# Patient Record
Sex: FEMALE | Race: WHITE | NOT HISPANIC OR LATINO | Employment: OTHER | URBAN - METROPOLITAN AREA
[De-identification: names, ages, dates, MRNs, and addresses within clinical notes are randomized per-mention and may not be internally consistent; named-entity substitution may affect disease eponyms.]

---

## 2017-03-20 ENCOUNTER — TRANSFERRED RECORDS (OUTPATIENT)
Dept: HEALTH INFORMATION MANAGEMENT | Facility: CLINIC | Age: 72
End: 2017-03-20

## 2017-03-20 ENCOUNTER — HOSPITAL ENCOUNTER (OUTPATIENT)
Dept: MAMMOGRAPHY | Facility: CLINIC | Age: 72
Discharge: HOME | End: 2017-03-20
Attending: FAMILY MEDICINE | Admitting: FAMILY MEDICINE
Payer: MEDICARE

## 2017-03-20 DIAGNOSIS — Z12.31 VISIT FOR SCREENING MAMMOGRAM: ICD-10-CM

## 2017-03-20 PROCEDURE — G0202 SCR MAMMO BI INCL CAD: HCPCS

## 2018-10-06 ENCOUNTER — HEALTH MAINTENANCE LETTER (OUTPATIENT)
Age: 73
End: 2018-10-06

## 2018-10-25 ENCOUNTER — TRANSFERRED RECORDS (OUTPATIENT)
Dept: HEALTH INFORMATION MANAGEMENT | Facility: CLINIC | Age: 73
End: 2018-10-25

## 2018-12-17 ENCOUNTER — HOSPITAL ENCOUNTER (OUTPATIENT)
Dept: MAMMOGRAPHY | Facility: CLINIC | Age: 73
Discharge: HOME | End: 2018-12-17
Attending: FAMILY MEDICINE | Admitting: FAMILY MEDICINE
Payer: MEDICARE

## 2018-12-17 ENCOUNTER — OFFICE VISIT (OUTPATIENT)
Dept: ENDOCRINOLOGY | Facility: CLINIC | Age: 73
End: 2018-12-17
Payer: MEDICARE

## 2018-12-17 ENCOUNTER — HOSPITAL ENCOUNTER (OUTPATIENT)
Dept: BONE DENSITY | Facility: CLINIC | Age: 73
End: 2018-12-17
Attending: INTERNAL MEDICINE
Payer: MEDICARE

## 2018-12-17 VITALS
HEIGHT: 62 IN | DIASTOLIC BLOOD PRESSURE: 62 MMHG | SYSTOLIC BLOOD PRESSURE: 119 MMHG | BODY MASS INDEX: 33.6 KG/M2 | HEART RATE: 75 BPM | WEIGHT: 182.6 LBS

## 2018-12-17 DIAGNOSIS — Z12.31 VISIT FOR SCREENING MAMMOGRAM: ICD-10-CM

## 2018-12-17 DIAGNOSIS — M81.0 AGE-RELATED OSTEOPOROSIS WITHOUT CURRENT PATHOLOGICAL FRACTURE: Primary | ICD-10-CM

## 2018-12-17 DIAGNOSIS — M81.0 AGE-RELATED OSTEOPOROSIS WITHOUT CURRENT PATHOLOGICAL FRACTURE: ICD-10-CM

## 2018-12-17 LAB — PTH-INTACT SERPL-MCNC: 43 PG/ML (ref 18–80)

## 2018-12-17 PROCEDURE — 82306 VITAMIN D 25 HYDROXY: CPT | Performed by: INTERNAL MEDICINE

## 2018-12-17 PROCEDURE — 77067 SCR MAMMO BI INCL CAD: CPT

## 2018-12-17 PROCEDURE — 36415 COLL VENOUS BLD VENIPUNCTURE: CPT | Performed by: INTERNAL MEDICINE

## 2018-12-17 PROCEDURE — 83735 ASSAY OF MAGNESIUM: CPT | Performed by: INTERNAL MEDICINE

## 2018-12-17 PROCEDURE — 77085 DXA BONE DENSITY AXL VRT FX: CPT

## 2018-12-17 PROCEDURE — 99215 OFFICE O/P EST HI 40 MIN: CPT | Performed by: INTERNAL MEDICINE

## 2018-12-17 PROCEDURE — 84100 ASSAY OF PHOSPHORUS: CPT | Performed by: INTERNAL MEDICINE

## 2018-12-17 PROCEDURE — 83970 ASSAY OF PARATHORMONE: CPT | Performed by: INTERNAL MEDICINE

## 2018-12-17 PROCEDURE — 80048 BASIC METABOLIC PNL TOTAL CA: CPT | Performed by: INTERNAL MEDICINE

## 2018-12-17 RX ORDER — VITAMIN K2 40 MCG
TABLET ORAL
COMMUNITY
Start: 2017-06-07

## 2018-12-17 RX ORDER — NICOTINE POLACRILEX 2 MG
1000 GUM BUCCAL
COMMUNITY
Start: 2017-06-07

## 2018-12-17 RX ORDER — FLUOCINONIDE 0.5 MG/G
CREAM TOPICAL
COMMUNITY
Start: 2018-10-31

## 2018-12-17 RX ORDER — ESCITALOPRAM OXALATE 20 MG/1
20 TABLET ORAL
COMMUNITY
Start: 2018-08-28

## 2018-12-17 ASSESSMENT — MIFFLIN-ST. JEOR: SCORE: 1286.52

## 2018-12-17 NOTE — PROGRESS NOTES
Name: Tamar Swift is a 73 year old woman, self referred for evaluation of   Previously seen by me at my former clinic ([facility]), here to establish care with [facility].    Chief Complaint   Patient presents with     Osteoporosis       HPI:  Recent issues:  Here for osteoporosis evaluation  Medical history from patient, outside records and Epic chart record        Previous history of scoliosis, osteoporosis, menopause  Abdominal hysterectomy age 29   Onset of hot flashes symptoms in her late 40's yoa  Issues with chronic back pain, has had orthopedic and neurology evaluations  Previous DEXA scans include 05, 07, 11/11/10  6/3/13 DEXA:   L1-2 T -1.2 and L1-4 T -0.7   Left femur neck T -2.0 and right femur neck T -1.1  11/30/15 DEXA:   L1-L4 T-score: T -1.4    Left hip fem neck T -1.6 and right hip fem neck T -1.4    Previous [facility] labs include:  Recent Labs   Lab Test 13  1225   TYRELL 9.5     Health history includes:  Bone fractures: none  Vitamin D deficiency: none   Kidney stones: none  Fam Hx:   Osteoporosis:   Kidney stones:   Hyperparathyroidism:  Recent [facility] labs include:  Lab Results   Component Value Date     2013    POTASSIUM 4.0 2014    CHLORIDE 103 2013    CO2 29 2013    ANIONGAP 10 2013     (A) 2014    BUN 20 2013    CR 0.78 2014    GFRESTIMATED 78 2014    GFRESTBLACK 90 2014    TYRELL 9.5 2013    TSH 1.13 2014     Osteoporosis medication use:  Took Evista with daily dosing for few weeks- months  Changed to Boniva monthly, took for approx 2 yrs duration   Had nausea, then discontinued ~ or   Prolia medication dosin2014, 2015, 2016... ~2017   Told of dental procedure 2017, to avoid Prolia     Supplements:   Vit D 2000 U capsule 1-every day   Calapatite Bone Builder 3-tabs daily (contains calcium 625 mg, phos 360 mg)  Recent  symptoms:   Low back pain and scoliosis, no indigestion   No dental pain      , lives in [place], also has son living with them  Sees Dr. Sarath Bliss for general medicine evaluations.    PMH/PSH:  Past Medical History:   Diagnosis Date     Allergic rhinitis, cause unspecified     cats, molds, dust mites     Benign neoplasm of breast 1963    incisional biopsy left breast     Chronic Pain Syndrome     eval by [facility]     Degeneration of intervertebral disc, site unspecified     lumbar,L4-5 andL5-S1deg disc with foraminal stenoses, mild left scoliosis     Depressive disorder, not elsewhere classified 1980s    chronic     Depressive disorder, not elsewhere classified      Dermatophytosis of nail     great toenails     Deviated nasal septum     repaired     Edema     episodic use diuretic     Esophageal reflux     eval by GI 10/05, resolved with dietary changes     Female stress incontinence     Marek Linda procedure     former smoker 2005    quit      Irritable bowel syndrome      LACTOSE INTOLERANCE      MEDICAL HISTORY OF -     echo - mild LVH with impaired LV relaxation, mild TR, EF 54%     Migraine, unspecified, without mention of intractable migraine without mention of status migrainosus     rare     NEURODERMATITIS      Normal Pregnancy      - vaginal, macrosomia     Obesity, unspecified      OSTEOPENIA 2002     Other and unspecified hyperlipidemia     elevated triglycerides     Other internal derangement of knee(717.89)     right knee arthroscopy - medial meniscus tear     Other kyphoscoliosis and scoliosis     mild, no surgery     Scoliosis (and kyphoscoliosis), idiopathic 2003     Uterovaginal Prolapse 1975    vaginal hysterectomy and AP repair     Past Surgical History:   Procedure Laterality Date     C ANTER VESICOURETHROPEXY,SIMPLE      Marek Linda incontinence procedure     [facility] BIOPSY BREAST, PERC NEEDLE CORE, NO IMAGING      left breast,  sclerosing adenosis     [facility] BIOPSY OF BREAST, OPEN INCISIONAL  1963    left breast mass     HC COLONOSCOPY THRU STOMA, DIAGNOSTIC  02-    normal     HC COLONOSCOPY THRU STOMA, DIAGNOSTIC      repeat in 7 yrs     HC KNEE SCOPE, DIAGNOSTIC      right knee - medial meniscus tear     HC REPAIR OF NASAL SEPTUM       HC UGI ENDOSCOPY, SIMPLE EXAM  2005    minimal erosion at EG junction, basically normal     HYSTERECTOMY, VAGINAL  1975    with AP repair, ovaries remain       Family Hx:  Family History   Problem Relation Age of Onset     Depression Father         post traumatic stress disorder, suicide age 59     Psychotic Disorder Son         bipolar     Heart Disease Mother          CHF age 92     Cancer Maternal Grandmother          esophageal cancer age 87     C.A.D. Paternal Grandfather          MI age 82         Social Hx:  Social History     Socioeconomic History     Marital status:      Spouse name: Corky     Number of children: 2     Years of education: 14     Highest education level: Not on file   Social Needs     Financial resource strain: Not on file     Food insecurity - worry: Not on file     Food insecurity - inability: Not on file     Transportation needs - medical: Not on file     Transportation needs - non-medical: Not on file   Occupational History     Occupation: housing supervisor     Comment: [employer]     Occupation: consultant     Comment: [employer]   Tobacco Use     Smoking status: Former Smoker     Packs/day: 0.50     Years: 23.00     Pack years: 11.50     Types: Cigarettes     Last attempt to quit: 1986     Years since quittin.9     Smokeless tobacco: Never Used   Substance and Sexual Activity     Alcohol use: Yes     Comment: 1 per month     Drug use: No     Sexual activity: Yes     Partners: Male     Comment: same partner since    Other Topics Concern      Service No     Blood Transfusions No     Caffeine Concern No      "Comment: little caffeine     Occupational Exposure No     Comment: works [employer], no patient care     Hobby Hazards No     Sleep Concern Yes     Stress Concern Yes     Comment: related to son and work     Weight Concern Yes     Comment: trying to lose     Special Diet No     Comment: lactose intolerant, some cheese     Back Care Yes     Comment: sees chiropracter for back pain     Exercise No     Bike Helmet Not Asked     Comment: no bike     Seat Belt Yes     Self-Exams Yes   Social History Narrative    Lives with .  Children age 33 and 30.  Son bipolar, continuing to have problems.                      MEDICATIONS:  has a current medication list which includes the following prescription(s): biotin, vitamin d3, docosahexaenoic acid, escitalopram, fluocinonide, hydrocodone-acetaminophen, vitamin k2, omega 3 500, alprazolam, calcium-vitamin d, triamcinolone, and triamterene-hctz.    ROS:     ROS: 10 point ROS neg other than the symptoms noted above in the HPI.    GENERAL: fatigue, wt stable- difficulty losing wt; denies fevers, chills, night sweats.    HEENT: no dysphagia, odonophagia, diplopia, neck pain  THYROID:  no apparent hyper or hypothyroid symptoms  CV: no chest pain, pressure, palpitations  LUNGS: no SOB, ALVAREZ, cough, wheezing   ABDOMEN: no diarrhea, constipation, abdominal pain  EXTREMITIES: no rashes, ulcers, edema  NEUROLOGY: no headaches, denies changes in vision, tingling, extremitiy numbness   MSK:  Back and leg pains; some leg weakness  SKIN: no rashes or lesions  : no menses  PSYCH:  stable mood, no significant anxiety or depression  ENDOCRINE: no heat or cold intolerance    Physical Exam   VS: /62   Pulse 75   Ht 1.575 m (5' 2\")   Wt 82.8 kg (182 lb 9.6 oz)   BMI 33.40 kg/m    GENERAL: AXOX3, NAD, eyeglasses, well dressed, answering questions appropriately, appears stated age.  THYROID:  normal gland, no apparent nodules or goiter  HEENT: neck non-tender, no exopthalmous, no proptosis, " EOMI  CV: RRR, no rubs, gallops, no murmurs  LUNGS: CTAB, no wheezes, rales, or ronchi  ABDOMEN: mildly obese, soft, nontender, nondistended  EXTREMITIES: no edema, no lesions  NEUROLOGY: CN grossly intact, no tremors  MSK: grossly intact  SKIN: no rashes, no lesions    LABS:    All pertinent notes, labs, and images personally reviewed by me.     A/P:  Encounter Diagnosis   Name Primary?     Age-related osteoporosis without current pathological fracture Yes       Comments:  Reviewed health history and osteoporosis issues.  Overdue for DEXA assessment and Prolia (or other) medication treatment    Plan:  Discussed general issues with the osteoporosis diagnosis and management  Reviewed concept of using the DEXA scan imaging to assess bone mineral density (BMD)  Discussed terminology of the T-score   We discussed lab tests to assess for secondary causes of bone thinning  Reviewed osteoporosis medication treatment options    Recommend:  Repeat DEXA scan at [facility], order placed  Check lab tests today   Advise restarting the Prolia 60 mg subcutaneous injection soon, then repeat every 6 months  Monitor for symptom changes  Continue calcium and vitamin D supplement use  Avoid heavy lifting and fall injuries    She plans to have f/u evaluations with her kinesiology practitioner  Addressed patient questions today    Patient Instructions   Check lab tests today  We will arrange a followup bone density Dexa scan    Consider restarting the Prolia injections every 6 months   Injection dosing at the [facility] 1st floor office.  They will call you to schedule    Labs ordered today:   Orders Placed This Encounter   Procedures     DX Hip/Pelvis/Spine     Basic metabolic panel     Parathyroid Hormone Intact     25- [place]-Vitamin D     Magnesium     Phosphorus     Radiology/Consults ordered today: DX HIP/PELVIS/SPINE    More than 50% of the time spent with Ms. Swift on counseling / coordinating her care.  Total  appointment time was 40 minutes.    Follow-up:  1 yr    Grayson Blackburn MD  Endocrinology  [facility]

## 2018-12-17 NOTE — PATIENT INSTRUCTIONS
Check lab tests today  We will arrange a followup bone density Dexa scan    Consider restarting the Prolia injections every 6 months   Injection dosing at the [facility] 1st floor office.  They will call you to schedule

## 2018-12-18 LAB
ANION GAP SERPL CALCULATED.3IONS-SCNC: 7 MMOL/L (ref 3–14)
BUN SERPL-MCNC: 21 MG/DL (ref 7–30)
CALCIUM SERPL-MCNC: 9.3 MG/DL (ref 8.5–10.1)
CHLORIDE SERPL-SCNC: 110 MMOL/L (ref 94–109)
CO2 SERPL-SCNC: 26 MMOL/L (ref 20–32)
CREAT SERPL-MCNC: 0.91 MG/DL (ref 0.52–1.04)
DEPRECATED CALCIDIOL+CALCIFEROL SERPL-MC: 43 UG/L (ref 20–75)
GFR SERPL CREATININE-BSD FRML MDRD: 61 ML/MIN/1.7M2
GLUCOSE SERPL-MCNC: 80 MG/DL (ref 70–99)
MAGNESIUM SERPL-MCNC: 2.1 MG/DL (ref 1.6–2.3)
PHOSPHATE SERPL-MCNC: 4.1 MG/DL (ref 2.5–4.5)
POTASSIUM SERPL-SCNC: 4 MMOL/L (ref 3.4–5.3)
SODIUM SERPL-SCNC: 143 MMOL/L (ref 133–144)

## 2018-12-18 NOTE — PATIENT INSTRUCTIONS
PROLIA  Risk Evaluation and Mitigation Strategy Best Practice Advisory    In May 2015, the FDA required an update to the PROLIA  (denosumab) REMS (Risk Evaluation and Mitigation Strategy) to inform both providers and patients about potential serious risks related to PROLIA .    These potential serious risks include:    Hypocalcemia    Osteonecrosis of the jaw    Atypical femoral fractures    Serious Infections    Dermatologic reactions    To ensure compliance with these requirements [employer] has developed a workflow for those patients who will receive PROLIA  at clinic.  This workflow includes insurance verification, patient scheduling, patient education, and documentation. Registered Nurses in the clinic should have completed eLearning related to the REMS and the clinic workflow.  Refer to them to initiate this process.  This workflow does not need to be executed if the patient is to receive PROLIA  injection at [facility].       The  has put together a Patient Education Toolkit.  Copies of these handouts may be available your clinic. Patients must receive the Patient Brochure and Medication Guide when receiving injection at clinic.  These will be attached to the injection ordered from [employer] to the clinic. Links to handouts:  Patient Counseling Chart for Healthcare Providers  Patient Brochure  Prescribing Information  Medication Guide    If you are having trouble accessing the above links, these documents can be found at: [email or web address]    The role of healthcare provider includes reviewing patient education materials with the patient, advising patients to seek medical attention if they have signs or symptoms of one of the serious risks, and provide patients a copy of the Patient Brochure and Medication Guide when receiving their injection.      Due to the risk of hypocalcemia the Prescribing  Information for PROLIA  recommends that calcium and mineral levels (magnesium and phosphorus) be checked within 14 days of injection for those predisposed to hypocalcemia.

## 2019-01-09 ENCOUNTER — TELEPHONE (OUTPATIENT)
Dept: FAMILY MEDICINE | Facility: CLINIC | Age: 74
End: 2019-01-09

## 2019-01-21 NOTE — TELEPHONE ENCOUNTER
[employer] verification complete. Patient has no out of pocket cost for Prolia. Patient can schedule for injection.    Valentin Vides CMA on 1/21/2019 at 3:41 PM

## 2019-12-15 ENCOUNTER — HEALTH MAINTENANCE LETTER (OUTPATIENT)
Age: 74
End: 2019-12-15

## 2020-11-06 ENCOUNTER — HOSPITAL ENCOUNTER (OUTPATIENT)
Dept: MAMMOGRAPHY | Facility: CLINIC | Age: 75
Discharge: HOME | End: 2020-11-06
Attending: FAMILY MEDICINE | Admitting: FAMILY MEDICINE
Payer: MEDICARE

## 2020-11-06 DIAGNOSIS — Z12.31 VISIT FOR SCREENING MAMMOGRAM: ICD-10-CM

## 2020-11-06 PROCEDURE — 77063 BREAST TOMOSYNTHESIS BI: CPT

## 2021-01-15 ENCOUNTER — HEALTH MAINTENANCE LETTER (OUTPATIENT)
Age: 76
End: 2021-01-15

## 2021-09-04 ENCOUNTER — HEALTH MAINTENANCE LETTER (OUTPATIENT)
Age: 76
End: 2021-09-04

## 2022-02-19 ENCOUNTER — HEALTH MAINTENANCE LETTER (OUTPATIENT)
Age: 77
End: 2022-02-19

## 2022-04-26 ENCOUNTER — HOSPITAL ENCOUNTER (OUTPATIENT)
Dept: MAMMOGRAPHY | Facility: CLINIC | Age: 77
Discharge: HOME | End: 2022-04-26
Attending: FAMILY MEDICINE | Admitting: FAMILY MEDICINE
Payer: MEDICARE

## 2022-04-26 ENCOUNTER — LAB REQUISITION (OUTPATIENT)
Dept: LAB | Facility: CLINIC | Age: 77
End: 2022-04-26
Payer: MEDICARE

## 2022-04-26 DIAGNOSIS — Z12.31 OTHER SCREENING MAMMOGRAM: ICD-10-CM

## 2022-04-26 DIAGNOSIS — L65.0 TELOGEN EFFLUVIUM: ICD-10-CM

## 2022-04-26 LAB
BASOPHILS # BLD AUTO: 0.1 10E3/UL (ref 0–0.2)
BASOPHILS NFR BLD AUTO: 1 %
EOSINOPHIL # BLD AUTO: 0.1 10E3/UL (ref 0–0.7)
EOSINOPHIL NFR BLD AUTO: 2 %
ERYTHROCYTE [DISTWIDTH] IN BLOOD BY AUTOMATED COUNT: 12.7 % (ref 10–15)
FERRITIN SERPL-MCNC: 85 NG/ML (ref 8–252)
HCT VFR BLD AUTO: 44.1 % (ref 35–47)
HGB BLD-MCNC: 14.6 G/DL (ref 11.7–15.7)
IMM GRANULOCYTES # BLD: 0 10E3/UL
IMM GRANULOCYTES NFR BLD: 0 %
IRON SATN MFR SERPL: 28 % (ref 15–46)
IRON SERPL-MCNC: 82 UG/DL (ref 35–180)
LYMPHOCYTES # BLD AUTO: 2.6 10E3/UL (ref 0.8–5.3)
LYMPHOCYTES NFR BLD AUTO: 32 %
MCH RBC QN AUTO: 29.3 PG (ref 26.5–33)
MCHC RBC AUTO-ENTMCNC: 33.1 G/DL (ref 31.5–36.5)
MCV RBC AUTO: 89 FL (ref 78–100)
MONOCYTES # BLD AUTO: 0.7 10E3/UL (ref 0–1.3)
MONOCYTES NFR BLD AUTO: 8 %
NEUTROPHILS # BLD AUTO: 4.6 10E3/UL (ref 1.6–8.3)
NEUTROPHILS NFR BLD AUTO: 57 %
NRBC # BLD AUTO: 0 10E3/UL
NRBC BLD AUTO-RTO: 0 /100
PLATELET # BLD AUTO: 298 10E3/UL (ref 150–450)
RBC # BLD AUTO: 4.98 10E6/UL (ref 3.8–5.2)
T4 FREE SERPL-MCNC: 1.16 NG/DL (ref 0.76–1.46)
TIBC SERPL-MCNC: 288 UG/DL (ref 240–430)
TSH SERPL DL<=0.005 MIU/L-ACNC: 1.36 MU/L (ref 0.4–4)
WBC # BLD AUTO: 8 10E3/UL (ref 4–11)

## 2022-04-26 PROCEDURE — 84270 ASSAY OF SEX HORMONE GLOBUL: CPT | Mod: ORL | Performed by: DERMATOLOGY

## 2022-04-26 PROCEDURE — 83550 IRON BINDING TEST: CPT | Mod: ORL | Performed by: DERMATOLOGY

## 2022-04-26 PROCEDURE — 82728 ASSAY OF FERRITIN: CPT | Mod: ORL | Performed by: DERMATOLOGY

## 2022-04-26 PROCEDURE — 82627 DEHYDROEPIANDROSTERONE: CPT | Mod: ORL | Performed by: DERMATOLOGY

## 2022-04-26 PROCEDURE — 84439 ASSAY OF FREE THYROXINE: CPT | Mod: ORL | Performed by: DERMATOLOGY

## 2022-04-26 PROCEDURE — 36415 COLL VENOUS BLD VENIPUNCTURE: CPT | Mod: ORL | Performed by: DERMATOLOGY

## 2022-04-26 PROCEDURE — 85025 COMPLETE CBC W/AUTO DIFF WBC: CPT | Mod: ORL | Performed by: DERMATOLOGY

## 2022-04-26 PROCEDURE — 77067 SCR MAMMO BI INCL CAD: CPT

## 2022-04-26 PROCEDURE — 84443 ASSAY THYROID STIM HORMONE: CPT | Mod: ORL | Performed by: DERMATOLOGY

## 2022-04-26 PROCEDURE — 84403 ASSAY OF TOTAL TESTOSTERONE: CPT | Mod: ORL | Performed by: DERMATOLOGY

## 2022-04-27 LAB — DHEA-S SERPL-MCNC: <15 UG/DL (ref 35–430)

## 2022-04-28 LAB — SHBG SERPL-SCNC: 72 NMOL/L (ref 30–135)

## 2022-05-04 LAB
TESTOST FREE SERPL-MCNC: 0.18 NG/DL
TESTOST SERPL-MCNC: 17 NG/DL (ref 8–60)

## 2022-10-22 ENCOUNTER — HEALTH MAINTENANCE LETTER (OUTPATIENT)
Age: 77
End: 2022-10-22

## 2022-12-04 ENCOUNTER — HEALTH MAINTENANCE LETTER (OUTPATIENT)
Age: 77
End: 2022-12-04

## 2024-01-13 ENCOUNTER — HEALTH MAINTENANCE LETTER (OUTPATIENT)
Age: 79
End: 2024-01-13

## 2025-01-26 ENCOUNTER — HEALTH MAINTENANCE LETTER (OUTPATIENT)
Age: 80
End: 2025-01-26